# Patient Record
Sex: MALE | Race: WHITE | NOT HISPANIC OR LATINO | URBAN - NONMETROPOLITAN AREA
[De-identification: names, ages, dates, MRNs, and addresses within clinical notes are randomized per-mention and may not be internally consistent; named-entity substitution may affect disease eponyms.]

---

## 2018-02-02 PROBLEM — H43.813: Noted: 2019-02-01

## 2018-02-02 PROBLEM — H52.4: Noted: 2018-02-02

## 2018-02-02 PROBLEM — H02.831: Noted: 2019-02-01

## 2018-02-02 PROBLEM — H01.024: Noted: 2019-02-01

## 2018-02-02 PROBLEM — Z96.1: Noted: 2018-02-02

## 2018-02-02 PROBLEM — H01.021: Noted: 2018-02-02

## 2018-02-02 PROBLEM — H02.834: Noted: 2019-02-01

## 2019-02-01 ENCOUNTER — IMPORTED ENCOUNTER (OUTPATIENT)
Dept: URBAN - NONMETROPOLITAN AREA CLINIC 1 | Facility: CLINIC | Age: 84
End: 2019-02-01

## 2019-02-01 PROCEDURE — 92015 DETERMINE REFRACTIVE STATE: CPT

## 2019-02-01 PROCEDURE — 92012 INTRM OPH EXAM EST PATIENT: CPT

## 2019-02-01 NOTE — PATIENT DISCUSSION
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 Sean Gonzalez in the future- Continue to monitorPVD OU- Discussed findings of exam in detail with the patient. - The risk of retinal detachment in patients with PVDs was discussed with the patient and the warning signs of retinal detachment were carefully reviewed with the patient. - The patient was warned to return to the office or contact the ophthalmologist on call immediately if they experience signs of retinal detachment. Prebyopia OU- Discussd refractive status with patient- New glasses Rx given today- Continue to monitor; 's Notes: MR 2/1/19DFE 2/1/19

## 2020-02-03 ENCOUNTER — IMPORTED ENCOUNTER (OUTPATIENT)
Dept: URBAN - NONMETROPOLITAN AREA CLINIC 1 | Facility: CLINIC | Age: 85
End: 2020-02-03

## 2020-02-03 PROCEDURE — 92015 DETERMINE REFRACTIVE STATE: CPT

## 2020-02-03 PROCEDURE — 92012 INTRM OPH EXAM EST PATIENT: CPT

## 2020-02-03 NOTE — PATIENT DISCUSSION
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 Igor Thomas in the future- Continue to monitorPVD OU- Discussed findings of exam in detail with the patient. - The risk of retinal detachment in patients with PVDs was discussed with the patient and the warning signs of retinal detachment were carefully reviewed with the patient. - The patient was warned to return to the office or contact the ophthalmologist on call immediately if they experience signs of retinal detachment. 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

## 2021-02-04 ENCOUNTER — PREPPED CHART (OUTPATIENT)
Dept: URBAN - NONMETROPOLITAN AREA CLINIC 1 | Facility: CLINIC | Age: 86
End: 2021-02-04

## 2021-02-04 ENCOUNTER — IMPORTED ENCOUNTER (OUTPATIENT)
Dept: URBAN - NONMETROPOLITAN AREA CLINIC 1 | Facility: CLINIC | Age: 86
End: 2021-02-04

## 2021-02-04 PROBLEM — Z96.1: Noted: 2021-02-04

## 2021-02-04 PROBLEM — H01.021: Noted: 2021-02-04

## 2021-02-04 PROBLEM — H01.024: Noted: 2021-02-04

## 2021-02-04 PROBLEM — H43.813: Noted: 2021-02-04

## 2021-02-04 PROBLEM — H02.831: Noted: 2021-02-04

## 2021-02-04 PROBLEM — H02.834: Noted: 2021-02-04

## 2021-02-04 PROBLEM — H52.4: Noted: 2021-02-04

## 2021-02-04 PROCEDURE — 92015 DETERMINE REFRACTIVE STATE: CPT

## 2021-02-04 PROCEDURE — 92014 COMPRE OPH EXAM EST PT 1/>: CPT

## 2021-02-04 NOTE — PATIENT DISCUSSION
No Superior Field of Vision loss- pt complains of lids drooping will consider lid eval with Dr. Ana Pop in the future.

## 2021-02-04 NOTE — PATIENT DISCUSSION
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 Lezlie Siemens in the future- Continue to monitorPVD OU- Discussed findings of exam in detail with the patient. - The risk of retinal detachment in patients with PVDs was discussed with the patient and the warning signs of retinal detachment were carefully reviewed with the patient. - The patient was warned to return to the office or contact the ophthalmologist on call immediately if they experience signs of retinal detachment. 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

## 2022-02-06 ASSESSMENT — VISUAL ACUITY
OS_SC: 20/20
OD_SC: 20/20

## 2022-02-06 ASSESSMENT — TONOMETRY
OS_IOP_MMHG: 15
OD_IOP_MMHG: 15

## 2022-02-07 ENCOUNTER — ESTABLISHED PATIENT (OUTPATIENT)
Dept: URBAN - NONMETROPOLITAN AREA CLINIC 1 | Facility: CLINIC | Age: 87
End: 2022-02-07

## 2022-02-07 DIAGNOSIS — H52.4: ICD-10-CM

## 2022-02-07 PROCEDURE — 92015 DETERMINE REFRACTIVE STATE: CPT

## 2022-02-07 PROCEDURE — 92014 COMPRE OPH EXAM EST PT 1/>: CPT

## 2022-02-07 ASSESSMENT — TONOMETRY
OD_IOP_MMHG: 16
OS_IOP_MMHG: 15

## 2022-02-07 ASSESSMENT — VISUAL ACUITY
OS_SC: 20/30+1
OD_SC: 20/30+1

## 2022-02-07 NOTE — PATIENT DISCUSSION
Discussed diagnosis in detail with patient. Recommend patient using J & J baby shampoo to scrub lid daily. Continue to monitor.

## 2022-02-07 NOTE — PATIENT DISCUSSION
No Superior Field of Vision loss- pt complains of lids drooping will consider lid eval with Dr. Serge Young in the future.

## 2022-04-09 ASSESSMENT — TONOMETRY
OD_IOP_MMHG: 14
OS_IOP_MMHG: 14
OD_IOP_MMHG: 14
OD_IOP_MMHG: 15
OS_IOP_MMHG: 15
OS_IOP_MMHG: 15

## 2022-04-09 ASSESSMENT — VISUAL ACUITY
OD_CC: 20/30+
OS_CC: 20/20
OD_CC: 20/25-2
OS_CC: 20/20
OD_CC: 20/20
OS_CC: 20/25-

## 2023-01-05 ENCOUNTER — EMERGENCY VISIT (OUTPATIENT)
Dept: URBAN - NONMETROPOLITAN AREA CLINIC 1 | Facility: CLINIC | Age: 88
End: 2023-01-05

## 2023-01-05 DIAGNOSIS — H02.004: ICD-10-CM

## 2023-01-05 PROCEDURE — 99213 OFFICE O/P EST LOW 20 MIN: CPT

## 2023-01-05 PROCEDURE — 67820 REVISE EYELASHES: CPT

## 2023-01-05 ASSESSMENT — VISUAL ACUITY
OS_SC: 20/30-1
OD_SC: 20/30-1
OU_SC: 20/25-1

## 2023-01-05 ASSESSMENT — TONOMETRY
OS_IOP_MMHG: 16
OD_IOP_MMHG: 16

## 2023-01-05 NOTE — PROCEDURE NOTE: CLINICAL
PROCEDURE NOTE: Epilation #8 Left Upper Lid. Diagnosis: Entropion and Trichiasis of Eyelid. Anesthesia: Topical. Prep: Antibiotic Drops. Prior to treatment, the risks/benefits/alternatives were discussed. The patient wished to proceed with procedure. A time out to confirm the patient, site, and procedure has been achieved. The site has been marked. Misdirected lashes were removed with forceps under view of the slit lamp. Post procedure instructions given. Patient tolerated procedure well. There were no complications. Italia Tobar

## 2023-01-05 NOTE — PATIENT DISCUSSION
No Superior Field of Vision loss- pt complains of lids drooping will consider lid eval with Dr. Mick Ngo in the future.

## 2023-01-05 NOTE — PATIENT DISCUSSION
Discussed diagnosis in detail with patient. 8x Trichiasis removed today at slit lamp with consent with no problems. Recommend patient talking to a specialist in the future if no improvement. Continue to monitior.

## 2023-06-08 ENCOUNTER — ESTABLISHED PATIENT (OUTPATIENT)
Dept: URBAN - NONMETROPOLITAN AREA CLINIC 1 | Facility: CLINIC | Age: 88
End: 2023-06-08

## 2023-06-08 DIAGNOSIS — H01.024: ICD-10-CM

## 2023-06-08 DIAGNOSIS — H01.021: ICD-10-CM

## 2023-06-08 DIAGNOSIS — H52.4: ICD-10-CM

## 2023-06-08 PROCEDURE — 92015 DETERMINE REFRACTIVE STATE: CPT

## 2023-06-08 PROCEDURE — 92014 COMPRE OPH EXAM EST PT 1/>: CPT

## 2023-06-08 ASSESSMENT — VISUAL ACUITY
OS_SC: 20/20-1
OU_SC: 20/20-1
OD_SC: 20/20-1

## 2023-06-08 ASSESSMENT — TONOMETRY
OS_IOP_MMHG: 12
OD_IOP_MMHG: 12

## 2023-09-22 ENCOUNTER — EMERGENCY VISIT (OUTPATIENT)
Dept: URBAN - NONMETROPOLITAN AREA CLINIC 1 | Facility: CLINIC | Age: 88
End: 2023-09-22

## 2023-09-22 DIAGNOSIS — H02.881: ICD-10-CM

## 2023-09-22 DIAGNOSIS — H02.125: ICD-10-CM

## 2023-09-22 PROCEDURE — 99213 OFFICE O/P EST LOW 20 MIN: CPT

## 2023-09-22 RX ORDER — ERYTHROMYCIN 5 MG/G: OINTMENT OPHTHALMIC EVERY EVENING

## 2023-09-22 ASSESSMENT — VISUAL ACUITY
OS_SC: 20/30
OD_SC: 20/40
OD_PH: 20/25

## 2023-09-22 ASSESSMENT — TONOMETRY
OS_IOP_MMHG: 15
OD_IOP_MMHG: 15

## 2023-10-10 ENCOUNTER — EMERGENCY VISIT (OUTPATIENT)
Dept: URBAN - NONMETROPOLITAN AREA CLINIC 1 | Facility: CLINIC | Age: 88
End: 2023-10-10

## 2023-10-10 DIAGNOSIS — H02.881: ICD-10-CM

## 2023-10-10 DIAGNOSIS — H02.122: ICD-10-CM

## 2023-10-10 DIAGNOSIS — H02.125: ICD-10-CM

## 2023-10-10 PROCEDURE — 99213 OFFICE O/P EST LOW 20 MIN: CPT

## 2023-10-10 ASSESSMENT — TONOMETRY
OS_IOP_MMHG: 14
OD_IOP_MMHG: 14

## 2023-10-10 ASSESSMENT — VISUAL ACUITY
OS_SC: 20/30
OU_SC: 20/30-1
OD_PH: 20/40
OD_SC: 20/50

## 2024-06-11 ENCOUNTER — ESTABLISHED PATIENT (OUTPATIENT)
Dept: URBAN - NONMETROPOLITAN AREA CLINIC 1 | Facility: CLINIC | Age: 89
End: 2024-06-11

## 2024-06-11 DIAGNOSIS — H52.4: ICD-10-CM

## 2024-06-11 PROCEDURE — 92015 DETERMINE REFRACTIVE STATE: CPT

## 2024-06-11 PROCEDURE — 92014 COMPRE OPH EXAM EST PT 1/>: CPT

## 2024-06-11 ASSESSMENT — VISUAL ACUITY
OD_SC: 20/22
OS_SC: 20/25-1
OU_SC: 20/20-1

## 2024-06-11 ASSESSMENT — TONOMETRY
OS_IOP_MMHG: 15
OD_IOP_MMHG: 15